# Patient Record
Sex: MALE | Race: WHITE | NOT HISPANIC OR LATINO
[De-identification: names, ages, dates, MRNs, and addresses within clinical notes are randomized per-mention and may not be internally consistent; named-entity substitution may affect disease eponyms.]

---

## 2019-12-10 PROBLEM — Z00.00 ENCOUNTER FOR PREVENTIVE HEALTH EXAMINATION: Status: ACTIVE | Noted: 2019-12-10

## 2019-12-15 ENCOUNTER — FORM ENCOUNTER (OUTPATIENT)
Age: 64
End: 2019-12-15

## 2019-12-16 ENCOUNTER — APPOINTMENT (OUTPATIENT)
Dept: RADIOLOGY | Facility: CLINIC | Age: 64
End: 2019-12-16

## 2019-12-16 ENCOUNTER — APPOINTMENT (OUTPATIENT)
Dept: ORTHOPEDIC SURGERY | Facility: CLINIC | Age: 64
End: 2019-12-16
Payer: COMMERCIAL

## 2019-12-16 ENCOUNTER — OUTPATIENT (OUTPATIENT)
Dept: OUTPATIENT SERVICES | Facility: HOSPITAL | Age: 64
LOS: 1 days | End: 2019-12-16
Payer: COMMERCIAL

## 2019-12-16 VITALS — BODY MASS INDEX: 25.71 KG/M2 | WEIGHT: 160 LBS | HEIGHT: 66 IN | RESPIRATION RATE: 16 BRPM

## 2019-12-16 PROCEDURE — 99204 OFFICE O/P NEW MOD 45 MIN: CPT

## 2019-12-16 PROCEDURE — 72170 X-RAY EXAM OF PELVIS: CPT | Mod: 26

## 2019-12-16 RX ORDER — LISINOPRIL 10 MG/1
10 TABLET ORAL
Refills: 0 | Status: ACTIVE | COMMUNITY

## 2020-01-21 ENCOUNTER — APPOINTMENT (OUTPATIENT)
Dept: PHYSICAL MEDICINE AND REHAB | Facility: CLINIC | Age: 65
End: 2020-01-21
Payer: COMMERCIAL

## 2020-01-21 VITALS
BODY MASS INDEX: 25.71 KG/M2 | OXYGEN SATURATION: 95 % | WEIGHT: 160 LBS | RESPIRATION RATE: 16 BRPM | HEIGHT: 66 IN | HEART RATE: 72 BPM

## 2020-01-21 DIAGNOSIS — F19.90 OTHER PSYCHOACTIVE SUBSTANCE USE, UNSPECIFIED, UNCOMPLICATED: ICD-10-CM

## 2020-01-21 PROCEDURE — 99203 OFFICE O/P NEW LOW 30 MIN: CPT

## 2020-01-27 ENCOUNTER — APPOINTMENT (OUTPATIENT)
Dept: ORTHOPEDIC SURGERY | Facility: CLINIC | Age: 65
End: 2020-01-27
Payer: COMMERCIAL

## 2020-01-27 DIAGNOSIS — M54.5 LOW BACK PAIN: ICD-10-CM

## 2020-01-27 PROCEDURE — 99213 OFFICE O/P EST LOW 20 MIN: CPT

## 2020-01-27 RX ORDER — MELOXICAM 15 MG/1
15 TABLET ORAL
Qty: 30 | Refills: 4 | Status: ACTIVE | COMMUNITY
Start: 2020-01-27 | End: 1900-01-01

## 2020-01-28 PROBLEM — F19.90 OCCASIONAL RECREATIONAL DRUG USE: Status: ACTIVE | Noted: 2020-01-21

## 2020-02-06 ENCOUNTER — APPOINTMENT (OUTPATIENT)
Dept: PHYSICAL MEDICINE AND REHAB | Facility: CLINIC | Age: 65
End: 2020-02-06

## 2020-03-09 ENCOUNTER — APPOINTMENT (OUTPATIENT)
Dept: ORTHOPEDIC SURGERY | Facility: CLINIC | Age: 65
End: 2020-03-09
Payer: COMMERCIAL

## 2020-03-09 DIAGNOSIS — M25.852 OTHER SPECIFIED JOINT DISORDERS, LEFT HIP: ICD-10-CM

## 2020-03-09 DIAGNOSIS — M25.851 OTHER SPECIFIED JOINT DISORDERS, RIGHT HIP: ICD-10-CM

## 2020-03-09 PROCEDURE — 20611 DRAIN/INJ JOINT/BURSA W/US: CPT | Mod: RT

## 2020-03-09 PROCEDURE — 99213 OFFICE O/P EST LOW 20 MIN: CPT | Mod: 25

## 2020-03-09 RX ORDER — DICLOFENAC SODIUM 10 MG/G
1 GEL TOPICAL DAILY
Qty: 1 | Refills: 3 | Status: ACTIVE | COMMUNITY
Start: 2020-03-09 | End: 1900-01-01

## 2020-03-10 ENCOUNTER — APPOINTMENT (OUTPATIENT)
Dept: ORTHOPEDIC SURGERY | Facility: CLINIC | Age: 65
End: 2020-03-10
Payer: COMMERCIAL

## 2020-03-10 PROCEDURE — 99213 OFFICE O/P EST LOW 20 MIN: CPT

## 2020-03-11 NOTE — ASSESSMENT
[FreeTextEntry1] : Assessment/Plan\par \par Bilateral hip OA (R>L)\par \par Interested in doing right THR first followed by left THR in 1 month\par \par Patient will benefit from the proposed procedure, she has failed a conservative treatment plan of supervised physical therapy, anti-inflammatory medications, and activity modification. She continues to have pain impacting her ability to perform ADL's and has a decreasing QoL. We will schedule this for the earliest mutually convenient time after the appropriate pre-op laboratory tests and clearances are obtained.  All questions were answered and a thorough explanation of RBA were given\par \par F/U  when schedules for right THR\par \par All medical record entries made by the PA/Scribe/Fellow are at my, Dr. Scott Joshua's direction and personally dictated by me on 03/10/2020]. I have reviewed the chart and agree that the record accurately reflects my personal performance of the history, physical exam, assessment, and plan. I have also personally directed reviewed, and agreed with the chart.\par

## 2020-03-11 NOTE — HISTORY OF PRESENT ILLNESS
[de-identified] : Otto is a 65 year old male here for bilateral hip pain (R>L). Patient has a history of hypertension and 40 plus years of smoking. He was refered by Dr. Barry for bilateral hip OA. She complains that she has difficulty ambulating for more than 3 blocks because of the pain in both hips. Patient is unable to ride a bike and go up the stairs. She does not find physical therapy helpful. Patient is interested in a THR as soon as possible to continue an active lifestyle. Patient denies any trauma, weakness, back pain, and radicular symptoms.

## 2020-06-12 ENCOUNTER — RESULT REVIEW (OUTPATIENT)
Age: 65
End: 2020-06-12

## 2020-06-12 ENCOUNTER — APPOINTMENT (OUTPATIENT)
Dept: ORTHOPEDIC SURGERY | Facility: CLINIC | Age: 65
End: 2020-06-12
Payer: COMMERCIAL

## 2020-06-12 ENCOUNTER — OUTPATIENT (OUTPATIENT)
Dept: OUTPATIENT SERVICES | Facility: HOSPITAL | Age: 65
LOS: 1 days | End: 2020-06-12
Payer: COMMERCIAL

## 2020-06-12 VITALS
HEIGHT: 66 IN | WEIGHT: 160 LBS | SYSTOLIC BLOOD PRESSURE: 130 MMHG | DIASTOLIC BLOOD PRESSURE: 64 MMHG | BODY MASS INDEX: 25.71 KG/M2 | OXYGEN SATURATION: 98 % | HEART RATE: 65 BPM | TEMPERATURE: 98.7 F

## 2020-06-12 DIAGNOSIS — D72.829 ELEVATED WHITE BLOOD CELL COUNT, UNSPECIFIED: ICD-10-CM

## 2020-06-12 DIAGNOSIS — Z01.818 ENCOUNTER FOR OTHER PREPROCEDURAL EXAMINATION: ICD-10-CM

## 2020-06-12 DIAGNOSIS — Z87.09 PERSONAL HISTORY OF OTHER DISEASES OF THE RESPIRATORY SYSTEM: ICD-10-CM

## 2020-06-12 DIAGNOSIS — Z86.79 PERSONAL HISTORY OF OTHER DISEASES OF THE CIRCULATORY SYSTEM: ICD-10-CM

## 2020-06-12 DIAGNOSIS — F17.200 NICOTINE DEPENDENCE, UNSPECIFIED, UNCOMPLICATED: ICD-10-CM

## 2020-06-12 DIAGNOSIS — M16.0 BILATERAL PRIMARY OSTEOARTHRITIS OF HIP: ICD-10-CM

## 2020-06-12 LAB
ANION GAP SERPL CALC-SCNC: 11 MMOL/L — SIGNIFICANT CHANGE UP (ref 5–17)
APPEARANCE UR: CLEAR — SIGNIFICANT CHANGE UP
APTT BLD: 34.5 SEC — SIGNIFICANT CHANGE UP (ref 27.5–36.3)
BILIRUB UR-MCNC: NEGATIVE — SIGNIFICANT CHANGE UP
BUN SERPL-MCNC: 17 MG/DL — SIGNIFICANT CHANGE UP (ref 7–23)
CALCIUM SERPL-MCNC: 8.9 MG/DL — SIGNIFICANT CHANGE UP (ref 8.4–10.5)
CHLORIDE SERPL-SCNC: 109 MMOL/L — HIGH (ref 96–108)
CO2 SERPL-SCNC: 24 MMOL/L — SIGNIFICANT CHANGE UP (ref 22–31)
COLOR SPEC: YELLOW — SIGNIFICANT CHANGE UP
CREAT SERPL-MCNC: 0.96 MG/DL — SIGNIFICANT CHANGE UP (ref 0.5–1.3)
DIFF PNL FLD: NEGATIVE — SIGNIFICANT CHANGE UP
GLUCOSE SERPL-MCNC: 115 MG/DL — HIGH (ref 70–99)
GLUCOSE UR QL: NEGATIVE — SIGNIFICANT CHANGE UP
HCT VFR BLD CALC: 44.8 % — SIGNIFICANT CHANGE UP (ref 39–50)
HGB BLD-MCNC: 14.3 G/DL — SIGNIFICANT CHANGE UP (ref 13–17)
INR BLD: 0.97 — SIGNIFICANT CHANGE UP (ref 0.88–1.16)
KETONES UR-MCNC: NEGATIVE — SIGNIFICANT CHANGE UP
LEUKOCYTE ESTERASE UR-ACNC: NEGATIVE — SIGNIFICANT CHANGE UP
MCHC RBC-ENTMCNC: 30.6 PG — SIGNIFICANT CHANGE UP (ref 27–34)
MCHC RBC-ENTMCNC: 31.9 GM/DL — LOW (ref 32–36)
MCV RBC AUTO: 95.9 FL — SIGNIFICANT CHANGE UP (ref 80–100)
NITRITE UR-MCNC: NEGATIVE — SIGNIFICANT CHANGE UP
NRBC # BLD: 0 /100 WBCS — SIGNIFICANT CHANGE UP (ref 0–0)
PH UR: 5.5 — SIGNIFICANT CHANGE UP (ref 5–8)
PLATELET # BLD AUTO: 312 K/UL — SIGNIFICANT CHANGE UP (ref 150–400)
POTASSIUM SERPL-MCNC: 4.8 MMOL/L — SIGNIFICANT CHANGE UP (ref 3.5–5.3)
POTASSIUM SERPL-SCNC: 4.8 MMOL/L — SIGNIFICANT CHANGE UP (ref 3.5–5.3)
PROT UR-MCNC: NEGATIVE MG/DL — SIGNIFICANT CHANGE UP
PROTHROM AB SERPL-ACNC: 11.1 SEC — SIGNIFICANT CHANGE UP (ref 10–12.9)
RBC # BLD: 4.67 M/UL — SIGNIFICANT CHANGE UP (ref 4.2–5.8)
RBC # FLD: 13.3 % — SIGNIFICANT CHANGE UP (ref 10.3–14.5)
SODIUM SERPL-SCNC: 144 MMOL/L — SIGNIFICANT CHANGE UP (ref 135–145)
SP GR SPEC: >=1.03 — SIGNIFICANT CHANGE UP (ref 1–1.03)
UROBILINOGEN FLD QL: 0.2 E.U./DL — SIGNIFICANT CHANGE UP
WBC # BLD: 13.94 K/UL — HIGH (ref 3.8–10.5)
WBC # FLD AUTO: 13.94 K/UL — HIGH (ref 3.8–10.5)

## 2020-06-12 PROCEDURE — 93010 ELECTROCARDIOGRAM REPORT: CPT

## 2020-06-12 PROCEDURE — 85730 THROMBOPLASTIN TIME PARTIAL: CPT

## 2020-06-12 PROCEDURE — 99214 OFFICE O/P EST MOD 30 MIN: CPT

## 2020-06-12 PROCEDURE — 85027 COMPLETE CBC AUTOMATED: CPT

## 2020-06-12 PROCEDURE — 71046 X-RAY EXAM CHEST 2 VIEWS: CPT | Mod: 26

## 2020-06-12 PROCEDURE — 87086 URINE CULTURE/COLONY COUNT: CPT

## 2020-06-12 PROCEDURE — 80048 BASIC METABOLIC PNL TOTAL CA: CPT

## 2020-06-12 PROCEDURE — 71046 X-RAY EXAM CHEST 2 VIEWS: CPT

## 2020-06-12 PROCEDURE — 85610 PROTHROMBIN TIME: CPT

## 2020-06-12 PROCEDURE — 93005 ELECTROCARDIOGRAM TRACING: CPT

## 2020-06-12 PROCEDURE — 81003 URINALYSIS AUTO W/O SCOPE: CPT

## 2020-06-12 RX ORDER — AZITHROMYCIN 250 MG/1
250 TABLET, FILM COATED ORAL
Qty: 1 | Refills: 0 | Status: DISCONTINUED | COMMUNITY
Start: 2020-06-12 | End: 2020-06-12

## 2020-06-13 LAB
CULTURE RESULTS: SIGNIFICANT CHANGE UP
SPECIMEN SOURCE: SIGNIFICANT CHANGE UP

## 2020-06-14 ENCOUNTER — LABORATORY RESULT (OUTPATIENT)
Age: 65
End: 2020-06-14

## 2020-06-15 ENCOUNTER — TRANSCRIPTION ENCOUNTER (OUTPATIENT)
Age: 65
End: 2020-06-15

## 2020-06-15 VITALS
SYSTOLIC BLOOD PRESSURE: 137 MMHG | HEIGHT: 66 IN | WEIGHT: 160.06 LBS | HEART RATE: 72 BPM | OXYGEN SATURATION: 99 % | DIASTOLIC BLOOD PRESSURE: 65 MMHG | RESPIRATION RATE: 16 BRPM | TEMPERATURE: 98 F

## 2020-06-15 NOTE — ASSESSMENT
[Other: _____] : [unfilled] [Patient Optimized for Surgery] : Patient optimized for surgery [As per surgery] : as per surgery [FreeTextEntry4] : The patient is a 65 year old man presenting with right hip osteoarthritis . The patient is planned for right total hip arthroplasty with Dr. Scott Joshua on 6/16/2020.\par \par His bloodwork shows leukocytosis with WBC 13.94.  The patient has chronic leukocytosis, and has had hematologic workup in the past.  He does not present with acute infectious symptoms.\par \par His Chest Xray shows questionable left lower lob consolidation.  I spoke directly with his Pulmonologist, Dr. Dameon Natarajan.  He confirms mild COPD which is stable.  He had CT Chest and PFTs last year, stable.  I discussed follow-up with his Pulmonologist postoperatively.\par \par -Labs/Diagnostics reviewed with patient in detail\par -METS >4\par -RCRI = 0, 3.9% 30-day risk of death/MI/cardiac arrest\par \par Patient is at low risk for low-intermediate risk surgery/procedure\par \par Patient is MEDICALLY OPTIMIZED TO PROCEED WITH PROPOSED SURGERY.\par \par EKG pending review\par \par **Case discussed with patient's primary care physician and specialist(s).\par Pulmonology - Dr. Dameon Natarajan\par \par Recommendations:\par 1) Chronic Leukocytosis - No signs of acute infection.  Continue to monitor\par 2) Questionable LLL Lung consolidation - Unclear if old.  Spoke with Pulmonologist.  OK to proceed with surgery.  Follow-up Post-Op\par 3) Continue Incruse inhaler.  Recommend incentive spirometry perioperatively\par \par

## 2020-06-15 NOTE — HISTORY OF PRESENT ILLNESS
[No Pertinent Cardiac History] : no history of aortic stenosis, atrial fibrillation, coronary artery disease, recent myocardial infarction, or implantable device/pacemaker [COPD] : COPD [No Adverse Anesthesia Reaction] : no adverse anesthesia reaction in self or family member [(Patient denies any chest pain, claudication, dyspnea on exertion, orthopnea, palpitations or syncope)] : Patient denies any chest pain, claudication, dyspnea on exertion, orthopnea, palpitations or syncope [Moderate (4-6 METs)] : Moderate (4-6 METs) [Chronic Anticoagulation] : no chronic anticoagulation [Chronic Kidney Disease] : no chronic kidney disease [Diabetes] : no diabetes [FreeTextEntry1] : Right Total Hip Arthroplasty [FreeTextEntry2] : 6/16//2020 [FreeTextEntry3] : Dr. Scott Joshua [FreeTextEntry4] : The patient is a 65 year old man presenting with right hip osteoarthritis . The patient is planned for right total hip arthroplasty with Dr. Scott Joshua on 6/16/2020.\par \par He has a history of COPD on Incruse Ellipta and follows with a Pulmonologist.  He has not been on steroids.  I spoke with his pulmonologist, Dr. Natarajan.  He states that he has mild COPD, and does not see contraindication to proceeding with surgery.\par \par The patient also reveals that he has chronic leukocytosis.  He has been worked up with a hematologist, and had a bone marrow biopsy which was apparently inconclusive.  The patient denies constitutional symptoms including fever, chills, malaise, weight loss, night sweats.  He denies productive cough, sinusitis, abdominal pain, nausea, vomiting, diarhea, urinary complaints.\par \par METS >4\par \par The patient denies recent/active cardiopulmonary symptoms including chest pain, shortness of breath, dyspnea of exertion, palpitations, swelling, headache, dizziness, syncope.\par \par The patient denies recent COVID exposure or COVID-related symptoms.\par \par  [FreeTextEntry5] : Chronic Leukocytosis

## 2020-06-15 NOTE — PHYSICAL EXAM
[No Acute Distress] : no acute distress [Well Nourished] : well nourished [Well Developed] : well developed [Well-Appearing] : well-appearing [EOMI] : extraocular movements intact [Normal Sclera/Conjunctiva] : normal sclera/conjunctiva [PERRL] : pupils equal round and reactive to light [Normal Oropharynx] : the oropharynx was normal [Normal Outer Ear/Nose] : the outer ears and nose were normal in appearance [Supple] : supple [No Lymphadenopathy] : no lymphadenopathy [No JVD] : no jugular venous distention [Thyroid Normal, No Nodules] : the thyroid was normal and there were no nodules present [No Respiratory Distress] : no respiratory distress  [Clear to Auscultation] : lungs were clear to auscultation bilaterally [Normal Rate] : normal rate  [No Accessory Muscle Use] : no accessory muscle use [Normal S1, S2] : normal S1 and S2 [Regular Rhythm] : with a regular rhythm [No Carotid Bruits] : no carotid bruits [No Murmur] : no murmur heard [No Abdominal Bruit] : a ~M bruit was not heard ~T in the abdomen [No Varicosities] : no varicosities [Pedal Pulses Present] : the pedal pulses are present [No Edema] : there was no peripheral edema [No Palpable Aorta] : no palpable aorta [No Extremity Clubbing/Cyanosis] : no extremity clubbing/cyanosis [Soft] : abdomen soft [Non-distended] : non-distended [Non Tender] : non-tender [No Masses] : no abdominal mass palpated [No HSM] : no HSM [Normal Bowel Sounds] : normal bowel sounds [Normal Posterior Cervical Nodes] : no posterior cervical lymphadenopathy [Normal Anterior Cervical Nodes] : no anterior cervical lymphadenopathy [No CVA Tenderness] : no CVA  tenderness [No Spinal Tenderness] : no spinal tenderness [No Rash] : no rash [Coordination Grossly Intact] : coordination grossly intact [No Focal Deficits] : no focal deficits [Normal Gait] : normal gait [Deep Tendon Reflexes (DTR)] : deep tendon reflexes were 2+ and symmetric [Normal Affect] : the affect was normal [Normal Insight/Judgement] : insight and judgment were intact [de-identified] : decreased hip ROM

## 2020-06-15 NOTE — RESULTS/DATA
[] : results reviewed [de-identified] : elevated WBC, no diff available [de-identified] : wnl [de-identified] : questionable LLL consolidation [de-identified] : pending [de-identified] : pending COVID screen, UA unremarkable

## 2020-06-15 NOTE — ADDENDUM
[FreeTextEntry1] : Labs/EKG/Imaging/COVID screen reviewed.  No acute abnormalities.  Patient may proceed with scheduled surgery.\par

## 2020-06-16 ENCOUNTER — APPOINTMENT (OUTPATIENT)
Dept: ORTHOPEDIC SURGERY | Facility: HOSPITAL | Age: 65
End: 2020-06-16
Payer: COMMERCIAL

## 2020-06-16 ENCOUNTER — INPATIENT (INPATIENT)
Facility: HOSPITAL | Age: 65
LOS: 0 days | Discharge: HOME HEALTH | DRG: 470 | End: 2020-06-17
Attending: ORTHOPAEDIC SURGERY | Admitting: ORTHOPAEDIC SURGERY
Payer: COMMERCIAL

## 2020-06-16 DIAGNOSIS — M16.11 UNILATERAL PRIMARY OSTEOARTHRITIS, RIGHT HIP: ICD-10-CM

## 2020-06-16 DIAGNOSIS — I10 ESSENTIAL (PRIMARY) HYPERTENSION: ICD-10-CM

## 2020-06-16 DIAGNOSIS — D72.829 ELEVATED WHITE BLOOD CELL COUNT, UNSPECIFIED: ICD-10-CM

## 2020-06-16 DIAGNOSIS — Z98.890 OTHER SPECIFIED POSTPROCEDURAL STATES: Chronic | ICD-10-CM

## 2020-06-16 LAB
MRSA PCR RESULT.: NEGATIVE — SIGNIFICANT CHANGE UP
S AUREUS DNA NOSE QL NAA+PROBE: NEGATIVE — SIGNIFICANT CHANGE UP

## 2020-06-16 PROCEDURE — 27130 TOTAL HIP ARTHROPLASTY: CPT | Mod: RT

## 2020-06-16 PROCEDURE — 72170 X-RAY EXAM OF PELVIS: CPT | Mod: 26

## 2020-06-16 RX ORDER — CEFAZOLIN SODIUM 1 G
2000 VIAL (EA) INJECTION EVERY 8 HOURS
Refills: 0 | Status: COMPLETED | OUTPATIENT
Start: 2020-06-16 | End: 2020-06-17

## 2020-06-16 RX ORDER — ONDANSETRON 8 MG/1
4 TABLET, FILM COATED ORAL EVERY 6 HOURS
Refills: 0 | Status: DISCONTINUED | OUTPATIENT
Start: 2020-06-16 | End: 2020-06-17

## 2020-06-16 RX ORDER — HYDROMORPHONE HYDROCHLORIDE 2 MG/ML
0.5 INJECTION INTRAMUSCULAR; INTRAVENOUS; SUBCUTANEOUS
Refills: 0 | Status: DISCONTINUED | OUTPATIENT
Start: 2020-06-16 | End: 2020-06-17

## 2020-06-16 RX ORDER — SODIUM CHLORIDE 9 MG/ML
1000 INJECTION, SOLUTION INTRAVENOUS
Refills: 0 | Status: DISCONTINUED | OUTPATIENT
Start: 2020-06-16 | End: 2020-06-17

## 2020-06-16 RX ORDER — AMLODIPINE BESYLATE 2.5 MG/1
10 TABLET ORAL DAILY
Refills: 0 | Status: DISCONTINUED | OUTPATIENT
Start: 2020-06-16 | End: 2020-06-17

## 2020-06-16 RX ORDER — POVIDONE-IODINE 5 %
1 AEROSOL (ML) TOPICAL ONCE
Refills: 0 | Status: COMPLETED | OUTPATIENT
Start: 2020-06-16 | End: 2020-06-16

## 2020-06-16 RX ORDER — CHLORHEXIDINE GLUCONATE 213 G/1000ML
1 SOLUTION TOPICAL ONCE
Refills: 0 | Status: COMPLETED | OUTPATIENT
Start: 2020-06-16 | End: 2020-06-16

## 2020-06-16 RX ORDER — OXYCODONE HYDROCHLORIDE 5 MG/1
5 TABLET ORAL EVERY 4 HOURS
Refills: 0 | Status: DISCONTINUED | OUTPATIENT
Start: 2020-06-16 | End: 2020-06-17

## 2020-06-16 RX ORDER — HYDROMORPHONE HYDROCHLORIDE 2 MG/ML
0.5 INJECTION INTRAMUSCULAR; INTRAVENOUS; SUBCUTANEOUS EVERY 4 HOURS
Refills: 0 | Status: DISCONTINUED | OUTPATIENT
Start: 2020-06-16 | End: 2020-06-17

## 2020-06-16 RX ORDER — MAGNESIUM HYDROXIDE 400 MG/1
30 TABLET, CHEWABLE ORAL ONCE
Refills: 0 | Status: DISCONTINUED | OUTPATIENT
Start: 2020-06-16 | End: 2020-06-17

## 2020-06-16 RX ORDER — OXYCODONE HYDROCHLORIDE 5 MG/1
10 TABLET ORAL EVERY 4 HOURS
Refills: 0 | Status: DISCONTINUED | OUTPATIENT
Start: 2020-06-16 | End: 2020-06-17

## 2020-06-16 RX ORDER — ASPIRIN/CALCIUM CARB/MAGNESIUM 324 MG
81 TABLET ORAL
Refills: 0 | Status: DISCONTINUED | OUTPATIENT
Start: 2020-06-16 | End: 2020-06-17

## 2020-06-16 RX ORDER — POLYETHYLENE GLYCOL 3350 17 G/17G
17 POWDER, FOR SOLUTION ORAL DAILY
Refills: 0 | Status: DISCONTINUED | OUTPATIENT
Start: 2020-06-16 | End: 2020-06-17

## 2020-06-16 RX ORDER — LISINOPRIL 2.5 MG/1
10 TABLET ORAL DAILY
Refills: 0 | Status: DISCONTINUED | OUTPATIENT
Start: 2020-06-16 | End: 2020-06-17

## 2020-06-16 RX ORDER — ACETAMINOPHEN 500 MG
975 TABLET ORAL EVERY 8 HOURS
Refills: 0 | Status: DISCONTINUED | OUTPATIENT
Start: 2020-06-16 | End: 2020-06-17

## 2020-06-16 RX ADMIN — Medication 1 APPLICATION(S): at 09:03

## 2020-06-16 RX ADMIN — Medication 975 MILLIGRAM(S): at 19:50

## 2020-06-16 RX ADMIN — Medication 81 MILLIGRAM(S): at 18:50

## 2020-06-16 RX ADMIN — Medication 100 MILLIGRAM(S): at 21:37

## 2020-06-16 RX ADMIN — CHLORHEXIDINE GLUCONATE 1 APPLICATION(S): 213 SOLUTION TOPICAL at 09:03

## 2020-06-16 RX ADMIN — OXYCODONE HYDROCHLORIDE 5 MILLIGRAM(S): 5 TABLET ORAL at 20:34

## 2020-06-16 RX ADMIN — SODIUM CHLORIDE 120 MILLILITER(S): 9 INJECTION, SOLUTION INTRAVENOUS at 21:37

## 2020-06-16 NOTE — PROGRESS NOTE ADULT - SUBJECTIVE AND OBJECTIVE BOX
Ortho Post Op Check    Procedure: Right JO   Surgeon: Dr. Joshua     Patient seen and examined at bedside in PACU   Pt comfortable without complaints, pain controlled  Denies CP, SOB, N/V, numbness/tingling     Vital Signs Last 24 Hrs  T(C): 36.2 (06-16-20 @ 13:40), Max: 36.2 (06-16-20 @ 13:40)  T(F): 97.2 (06-16-20 @ 13:40), Max: 97.2 (06-16-20 @ 13:40)  HR: 64 (06-16-20 @ 14:10) (60 - 68)  BP: 132/76 (06-16-20 @ 14:10) (132/76 - 152/78)  BP(mean): --  RR: 14 (06-16-20 @ 14:10) (14 - 16)  SpO2: 97% (06-16-20 @ 14:10) (96% - 99%)  AVSS    General: Pt Alert and oriented, NAD  Dressing C/D/I: gauze, tedagerm at the right hip   Pulses: 2+ DP pulses bilateral LE   Sensation: intact to light touch bilateral LE   Motor: EHL/FHL/TA/GS 5/5             Post-op X-Ray:    A/P: 65yMale POD#0 s/p   - Stable  - Pain Control  - DVT ppx:  - Post op abx:  - PT, WBS:     Ortho Pager [phone] Ortho Post Op Check    Procedure: Right JO   Surgeon: Dr. Joshua     Patient seen and examined at bedside in PACU   Pt comfortable without complaints, pain controlled  Denies CP, SOB, N/V, numbness/tingling     Vital Signs Last 24 Hrs  T(C): 36.2 (06-16-20 @ 13:40), Max: 36.2 (06-16-20 @ 13:40)  T(F): 97.2 (06-16-20 @ 13:40), Max: 97.2 (06-16-20 @ 13:40)  HR: 64 (06-16-20 @ 14:10) (60 - 68)  BP: 132/76 (06-16-20 @ 14:10) (132/76 - 152/78)  BP(mean): --  RR: 14 (06-16-20 @ 14:10) (14 - 16)  SpO2: 97% (06-16-20 @ 14:10) (96% - 99%)  AVSS    General: Pt Alert and oriented, NAD  Dressing C/D/I: gauze, tedagerm at the right hip   Pulses: 2+ DP pulses bilateral LE   Sensation: intact to light touch bilateral LE   Motor: EHL/FHL/TA/GS 5/5             Post-op X-Ray: Right total hip replacement in anatomic alignment    A/P: 65yMale POD#0 s/p right JO      - Stable  - Pain Control: IV and PO PRN  - DVT ppx: 81mg BID  - Post op abx: ancef x2  - PT, WBS: protected WB RLE    Ortho Pager [phone]

## 2020-06-16 NOTE — H&P ADULT - NSHPLABSRESULTS_GEN_ALL_CORE
Preop CBC, BMP, PT/INR, PTT, UA WNL  Urine culture with <10,000 of mixed growth  3M DOS  Preop CXR WNL per clearance   Preop EKG WNL per clearance Preop BMP, PT/INR, PTT, UA WNL  Preop CBC 13.94 WBC - known chronic idiopathic leukocytosis  Urine culture with <10,000 of mixed growth  3M DOS  Preop CXR WNL per clearance   Preop EKG WNL per clearance

## 2020-06-16 NOTE — PHYSICAL THERAPY INITIAL EVALUATION ADULT - GAIT DEVIATIONS NOTED, PT EVAL
decreased chaitanya/toe clearance decreased/decreased weight-shifting ability/decreased stride length

## 2020-06-16 NOTE — H&P ADULT - PROBLEM SELECTOR PLAN 1
Admit to Orthopedic service  For elective right total hip replacement  Medically cleared for surgery by Dr. Francis

## 2020-06-16 NOTE — H&P ADULT - HISTORY OF PRESENT ILLNESS
66 yo M with right hip pain x years worsened over the last 6 months without improvement. Failed conservative treatments. Denies numbness, tingling. Ambulates without assist. Pt denies any recent fevers/chills, chest pain, body aches, shortness of breath, sick contacts. Denies history of DVT/PE. Presents today for elective right total hip replacement.

## 2020-06-16 NOTE — PHYSICAL THERAPY INITIAL EVALUATION ADULT - ADDITIONAL COMMENTS
Pt reports being independent without use of AD in PLOF. He reports not having stairs to access home and living space, however, stairs required to access basement (1 flight). Pt lives with his son and friend.

## 2020-06-16 NOTE — H&P ADULT - NSHPPHYSICALEXAM_GEN_ALL_CORE
NAD, sitting comfortably in chair  Decreased ROM of right hip secondary to pain, EHL/FHL/GS/TA 5/5 BLE, 2+ DP BLE, SILT throughout

## 2020-06-16 NOTE — PHYSICAL THERAPY INITIAL EVALUATION ADULT - PERTINENT HX OF CURRENT PROBLEM, REHAB EVAL
64 yo M with right hip pain x years worsened over the last 6 months without improvement. Failed conservative treatments. Denies numbness, tingling. Ambulates without assist. Pt denies any recent fevers/chills, chest pain, body aches, shortness of breath, sick contacts. Denies history of DVT/PE. Presents today for elective right total hip replacement.

## 2020-06-17 ENCOUNTER — TRANSCRIPTION ENCOUNTER (OUTPATIENT)
Age: 65
End: 2020-06-17

## 2020-06-17 VITALS — DIASTOLIC BLOOD PRESSURE: 70 MMHG | SYSTOLIC BLOOD PRESSURE: 110 MMHG

## 2020-06-17 LAB
ANION GAP SERPL CALC-SCNC: 11 MMOL/L — SIGNIFICANT CHANGE UP (ref 5–17)
BUN SERPL-MCNC: 13 MG/DL — SIGNIFICANT CHANGE UP (ref 7–23)
CALCIUM SERPL-MCNC: 8.3 MG/DL — LOW (ref 8.4–10.5)
CHLORIDE SERPL-SCNC: 104 MMOL/L — SIGNIFICANT CHANGE UP (ref 96–108)
CO2 SERPL-SCNC: 23 MMOL/L — SIGNIFICANT CHANGE UP (ref 22–31)
CREAT SERPL-MCNC: 0.72 MG/DL — SIGNIFICANT CHANGE UP (ref 0.5–1.3)
GLUCOSE SERPL-MCNC: 129 MG/DL — HIGH (ref 70–99)
HCT VFR BLD CALC: 37.5 % — LOW (ref 39–50)
HCV AB S/CO SERPL IA: 12.35 S/CO — SIGNIFICANT CHANGE UP
HCV AB SERPL-IMP: REACTIVE
HGB BLD-MCNC: 12.1 G/DL — LOW (ref 13–17)
MCHC RBC-ENTMCNC: 30.4 PG — SIGNIFICANT CHANGE UP (ref 27–34)
MCHC RBC-ENTMCNC: 32.3 GM/DL — SIGNIFICANT CHANGE UP (ref 32–36)
MCV RBC AUTO: 94.2 FL — SIGNIFICANT CHANGE UP (ref 80–100)
NRBC # BLD: 0 /100 WBCS — SIGNIFICANT CHANGE UP (ref 0–0)
PLATELET # BLD AUTO: 247 K/UL — SIGNIFICANT CHANGE UP (ref 150–400)
POTASSIUM SERPL-MCNC: 4.3 MMOL/L — SIGNIFICANT CHANGE UP (ref 3.5–5.3)
POTASSIUM SERPL-SCNC: 4.3 MMOL/L — SIGNIFICANT CHANGE UP (ref 3.5–5.3)
RBC # BLD: 3.98 M/UL — LOW (ref 4.2–5.8)
RBC # FLD: 13 % — SIGNIFICANT CHANGE UP (ref 10.3–14.5)
SODIUM SERPL-SCNC: 138 MMOL/L — SIGNIFICANT CHANGE UP (ref 135–145)
WBC # BLD: 22.55 K/UL — HIGH (ref 3.8–10.5)
WBC # FLD AUTO: 22.55 K/UL — HIGH (ref 3.8–10.5)

## 2020-06-17 PROCEDURE — 72170 X-RAY EXAM OF PELVIS: CPT

## 2020-06-17 PROCEDURE — 87641 MR-STAPH DNA AMP PROBE: CPT

## 2020-06-17 PROCEDURE — 86803 HEPATITIS C AB TEST: CPT

## 2020-06-17 PROCEDURE — 87521 HEPATITIS C PROBE&RVRS TRNSC: CPT

## 2020-06-17 PROCEDURE — 36415 COLL VENOUS BLD VENIPUNCTURE: CPT

## 2020-06-17 PROCEDURE — 97161 PT EVAL LOW COMPLEX 20 MIN: CPT

## 2020-06-17 PROCEDURE — C1776: CPT

## 2020-06-17 PROCEDURE — 80048 BASIC METABOLIC PNL TOTAL CA: CPT

## 2020-06-17 PROCEDURE — 99223 1ST HOSP IP/OBS HIGH 75: CPT

## 2020-06-17 PROCEDURE — 97116 GAIT TRAINING THERAPY: CPT

## 2020-06-17 PROCEDURE — 85027 COMPLETE CBC AUTOMATED: CPT

## 2020-06-17 RX ORDER — ASPIRIN/CALCIUM CARB/MAGNESIUM 324 MG
1 TABLET ORAL
Qty: 60 | Refills: 0
Start: 2020-06-17 | End: 2020-07-16

## 2020-06-17 RX ORDER — ACETAMINOPHEN 500 MG
650 TABLET ORAL EVERY 6 HOURS
Refills: 0 | Status: DISCONTINUED | OUTPATIENT
Start: 2020-06-17 | End: 2020-06-17

## 2020-06-17 RX ORDER — ASPIRIN/CALCIUM CARB/MAGNESIUM 324 MG
1 TABLET ORAL
Qty: 0 | Refills: 0 | DISCHARGE
Start: 2020-06-17 | End: 2020-07-16

## 2020-06-17 RX ORDER — KETOROLAC TROMETHAMINE 30 MG/ML
15 SYRINGE (ML) INJECTION EVERY 6 HOURS
Refills: 0 | Status: DISCONTINUED | OUTPATIENT
Start: 2020-06-17 | End: 2020-06-17

## 2020-06-17 RX ORDER — ACETAMINOPHEN 500 MG
2 TABLET ORAL
Qty: 0 | Refills: 0 | DISCHARGE
Start: 2020-06-17

## 2020-06-17 RX ORDER — OXYCODONE HYDROCHLORIDE 5 MG/1
1 TABLET ORAL
Qty: 42 | Refills: 0
Start: 2020-06-17 | End: 2020-06-23

## 2020-06-17 RX ORDER — DICLOFENAC SODIUM 30 MG/G
1 GEL TOPICAL
Qty: 0 | Refills: 0 | DISCHARGE

## 2020-06-17 RX ADMIN — OXYCODONE HYDROCHLORIDE 10 MILLIGRAM(S): 5 TABLET ORAL at 10:17

## 2020-06-17 RX ADMIN — Medication 100 MILLIGRAM(S): at 05:17

## 2020-06-17 RX ADMIN — OXYCODONE HYDROCHLORIDE 10 MILLIGRAM(S): 5 TABLET ORAL at 05:31

## 2020-06-17 RX ADMIN — AMLODIPINE BESYLATE 10 MILLIGRAM(S): 2.5 TABLET ORAL at 05:31

## 2020-06-17 RX ADMIN — POLYETHYLENE GLYCOL 3350 17 GRAM(S): 17 POWDER, FOR SOLUTION ORAL at 11:58

## 2020-06-17 RX ADMIN — Medication 81 MILLIGRAM(S): at 05:31

## 2020-06-17 RX ADMIN — Medication 650 MILLIGRAM(S): at 11:58

## 2020-06-17 RX ADMIN — LISINOPRIL 10 MILLIGRAM(S): 2.5 TABLET ORAL at 06:58

## 2020-06-17 RX ADMIN — OXYCODONE HYDROCHLORIDE 10 MILLIGRAM(S): 5 TABLET ORAL at 00:56

## 2020-06-17 NOTE — CONSULT NOTE ADULT - ASSESSMENT
65M w tobacco use (2-3PPD x40y), HTN, HCV s/p treatment, COPD on incruse, R hip OA here for R JO w Dr. Joshua    #Post-op state - pain controlled. on bowel regimen, IS at bedside. PPx: ASA BID. Dispo: HPT  #R Hip OA - s/p R JO, mgmt per primary team  #COPD - stable. does not appear in acute exacerbation. c/w incruse daily  #HTN - at target. c/w home amlodipine and lisinopril  #HCV - s/p treatment year ago per pt  #Tobacco use - +cravings but declining nicotine patch. Pre-contemplative.  #Leukocytosis - likely reactive from post-operative stress. Non-toxic appearing. Baseline at 14 -- hx states idiopathic elevated baseline    Recommendations  --Counseled patient on smoking cessation. Readiness to quit contemplative. recommend continued reinforcement as outpatient  --From medical standpoint, patient optimized for disposition home

## 2020-06-17 NOTE — DISCHARGE NOTE PROVIDER - NSDCCPCAREPLAN_GEN_ALL_CORE_FT
PRINCIPAL DISCHARGE DIAGNOSIS  Diagnosis: Osteoarthritis of right hip  Assessment and Plan of Treatment: improvement s/p right total hip replacement

## 2020-06-17 NOTE — DISCHARGE NOTE PROVIDER - NSDCACTIVITY_GEN_ALL_CORE
Do not drive or operate machinery/Walking - Indoors allowed/Showering allowed/No heavy lifting/straining

## 2020-06-17 NOTE — DISCHARGE NOTE PROVIDER - NSDCFUADDINST_GEN_ALL_CORE_FT
50% protected weight on operated hip using a rolling walker  Anterior hip precautions to prevent hip dislocation  No crossing your legs. Do not bend past your waist.  Use long-handled reach to pick things up if purchased.  Sit in a high chair.  If you do not have a high chair, use cushions on the chair  No strenuous activity, heavy lifting, driving or returning to work until cleared by MD.  You may shower - dressing is water-resistant, no soaking in bathtubs.  Keep dressing on your hip until the follow up appointment.  Try to have regular bowel movements, take stool softener or laxative if necessary.  May take Pepcid or Zantac for upset stomach.  Swelling may travel all the way down leg to foot, this is normal and will subside in a few weeks.  Call to schedule an appt with Dr. Joshua for follow up.    Contact your doctor if you experience: fever greater than 101.5, chills, chest pain, difficulty breathing, redness or excessive drainage around the incision, other concerns.  Follow up with your primary care provider.

## 2020-06-17 NOTE — OCCUPATIONAL THERAPY INITIAL EVALUATION ADULT - GENERAL OBSERVATIONS, REHAB EVAL
Patient cleared for Occupational Therapy by IVETTE Ken and Ortho NP Anand for PWB 50% RLE. Pt received standing, NAD, +IV heplock.

## 2020-06-17 NOTE — DISCHARGE NOTE PROVIDER - CARE PROVIDER_API CALL
Scott Joshua)  Orthopaedic Surgery  [address]  [place], [place] [address]  Phone: [phone]  Fax: [phone]  Follow Up Time: 2 weeks

## 2020-06-17 NOTE — OCCUPATIONAL THERAPY INITIAL EVALUATION ADULT - PERTINENT HX OF CURRENT PROBLEM, REHAB EVAL
64 yo M with right hip pain x years worsened over the last 6 months without improvement. Failed conservative treatments. Denies numbness, tingling. Ambulates without assist. Pt denies any recent fevers/chills, chest pain, body aches, shortness of breath, sick contacts. Denies history of DVT/PE. Presents today for elective right total hip replacement. S/P R JO 6/16/20.

## 2020-06-17 NOTE — DISCHARGE NOTE PROVIDER - NSDCHHNEEDSERVICE_GEN_ALL_CORE
Rehabilitation services/Teaching and training/Wound care and assessment/Observation and assessment/Medication teaching and assessment

## 2020-06-17 NOTE — DISCHARGE NOTE PROVIDER - NSDCMRMEDTOKEN_GEN_ALL_CORE_FT
acetaminophen 325 mg oral tablet: 2 tab(s) orally every 6 hours, as needed for mild pain  amLODIPine 10 mg oral tablet: 1 tab(s) orally once a day  aspirin 81 mg oral delayed release tablet: 1 tab(s) orally 2 times a day for 1 month  lisinopril 10 mg oral tablet: 1 tab(s) orally once a day  meloxicam 15 mg oral tablet: 1 tab(s) orally once a day  oxyCODONE 5 mg oral tablet: 1-2 tab(s) orally every 4-6 hours, as needed for moderate to severe pain MDD:6

## 2020-06-17 NOTE — PROGRESS NOTE ADULT - SUBJECTIVE AND OBJECTIVE BOX
Ortho Note    Pt seen and examined at bedside. Reports appropriate incisional pain but improves with pain meds  Denies CP, SOB, N/V, numbness/tingling     Vital Signs Last 24 Hrs  T(C): 36.7 (06-17-20 @ 05:29), Max: 36.7 (06-17-20 @ 05:29)  T(F): 98.1 (06-17-20 @ 05:29), Max: 98.1 (06-17-20 @ 05:29)  HR: 66 (06-17-20 @ 05:29) (66 - 66)  BP: 128/74 (06-17-20 @ 05:29) (128/74 - 128/74)  BP(mean): --  RR: 16 (06-17-20 @ 05:29) (16 - 16)  SpO2: 96% (06-17-20 @ 05:29) (96% - 96%)  AVSS    General: Pt Alert and oriented, NAD  Right hip guaze, ABD and tegaderm DSG C/D/I  Sensation intact s/s/sp/dp/t and symmetric   Motor: EHL/FHL/TA/GS 5/5  2+ DP/PT pulse  Toes WWP      A/P: 65yMale POD#1 s/p R JO    - Stable  - Pain Control  - DVT ppx: ASA 81  - PT, WBS: 50% WB RLE  - f/u AM labs  - continue home meds  - dispo: home pending PT clearance     Ortho Pager [phone]

## 2020-06-17 NOTE — CONSULT NOTE ADULT - SUBJECTIVE AND OBJECTIVE BOX
65M w tobacco use (2-3PPD x40y), HTN, HCV s/p treatment, COPD on incruse, R hip OA here for R JO w Dr. Joshua    Pt presenting with progressive R hip pain and was previously scheduled for R JO in late March. Delayed due to pandemic. Today states pain in R thigh is controlled. Worked w PT wo lightheadedness, chest pain, dyspnea. No numbness. Eating wo N/V/Abd pain. No dysuria. No BM or flatus.     FH: non-contributory  SH: smokes 2-3 ppd x40y. No EtOH    64 yo M with right hip pain x years worsened over the last 6 months without improvement. Failed conservative treatments. Denies numbness, tingling. Ambulates without assist. Pt denies any recent fevers/chills, chest pain, body aches, shortness of breath, sick contacts. Denies history of DVT/PE. Presents today for elective right total hip replacement. (16 Jun 2020 09:23)      PAST MEDICAL & SURGICAL HISTORY:  Leukocytosis: idiopathic  Hypertension  H/O hernia repair    Home Meds: Home Medications:  acetaminophen 325 mg oral tablet: 2 tab(s) orally every 6 hours, as needed for mild pain (17 Jun 2020 10:24)  amLODIPine 10 mg oral tablet: 1 tab(s) orally once a day (16 Jun 2020 08:38)  lisinopril 10 mg oral tablet: 1 tab(s) orally once a day (16 Jun 2020 08:38)  meloxicam 15 mg oral tablet: 1 tab(s) orally once a day (16 Jun 2020 08:38)    Allergies: Allergies    No Known Allergies    Intolerances      Soc:   Advanced Directives: Presumed Full Code     CURRENT MEDICATIONS:   --------------------------------------------------------------------------------------  Neurologic Medications  acetaminophen   Tablet .. 650 milliGRAM(s) Oral every 6 hours  HYDROmorphone  Injectable 0.5 milliGRAM(s) IV Push every 4 hours PRN breakthrough pain  HYDROmorphone  Injectable 0.5 milliGRAM(s) IV Push every 15 minutes  ketorolac   Injectable 15 milliGRAM(s) IV Push every 6 hours  ondansetron Injectable 4 milliGRAM(s) IV Push every 6 hours PRN Nausea and/or Vomiting  oxyCODONE    IR 5 milliGRAM(s) Oral every 4 hours PRN Moderate Pain (4 - 6)  oxyCODONE    IR 10 milliGRAM(s) Oral every 4 hours PRN Severe Pain (7 - 10)    Respiratory Medications    Cardiovascular Medications  amLODIPine   Tablet 10 milliGRAM(s) Oral daily  lisinopril 10 milliGRAM(s) Oral daily    Gastrointestinal Medications  aluminum hydroxide/magnesium hydroxide/simethicone Suspension 30 milliLiter(s) Oral four times a day PRN Indigestion  lactated ringers. 1000 milliLiter(s) IV Continuous <Continuous>  magnesium hydroxide Suspension 30 milliLiter(s) Oral once PRN Constipation  polyethylene glycol 3350 17 Gram(s) Oral daily    Genitourinary Medications    Hematologic/Oncologic Medications  aspirin enteric coated 81 milliGRAM(s) Oral two times a day    Antimicrobial/Immunologic Medications    Endocrine/Metabolic Medications    Topical/Other Medications    --------------------------------------------------------------------------------------    VITAL SIGNS, INS/OUTS (last 24 hours):  --------------------------------------------------------------------------------------  ICU Vital Signs Last 24 Hrs  T(C): 37 (17 Jun 2020 08:57), Max: 37 (17 Jun 2020 08:57)  T(F): 98.6 (17 Jun 2020 08:57), Max: 98.6 (17 Jun 2020 08:57)  HR: 70 (17 Jun 2020 08:57) (60 - 82)  BP: 110/70 (17 Jun 2020 10:40) (104/61 - 179/96)  BP(mean): --  ABP: --  ABP(mean): --  RR: 18 (17 Jun 2020 08:57) (14 - 19)  SpO2: 94% (17 Jun 2020 08:57) (94% - 99%)    I&O's Summary    16 Jun 2020 07:01  -  17 Jun 2020 07:00  --------------------------------------------------------  IN: 960 mL / OUT: 1950 mL / NET: -990 mL    17 Jun 2020 07:01  -  17 Jun 2020 13:39  --------------------------------------------------------  IN: 200 mL / OUT: 0 mL / NET: 200 mL      --------------------------------------------------------------------------------------    EXAM:  GEN: Male in NAD on RA  HEENT: NC/AT, MMM  CV: RRR, nml S1S2, no BLE edema  PULM: nml effort, faint expiratory wheeze in LLL but otherwise CTAB wo rales, rhonchi  ABD: Soft, NABS, non-tender  NEURO: Alert, moving all extremities, sensation intact  PSYCH: appropriate, conversant    LABS  --------------------------------------------------------------------------------------  Labs:  CAPILLARY BLOOD GLUCOSE                              12.1   22.55 )-----------( 247      ( 17 Jun 2020 06:33 )             37.5         06-17    138  |  104  |  13  ----------------------------<  129<H>  4.3   |  23  |  0.72      Calcium, Total Serum: 8.3 mg/dL (06-17-20 @ 06:33)      LFTs:         Coags:                  --------------------------------------------------------------------------------------    OTHER LABS    IMAGING RESULTS  ****************

## 2020-06-17 NOTE — DISCHARGE NOTE NURSING/CASE MANAGEMENT/SOCIAL WORK - PATIENT PORTAL LINK FT
You can access the FollowMyHealth Patient Portal offered by [facility] by registering at the following website: [email or web address]. By joining [facility]’s FollowMyHealth portal, you will also be able to view your health information using other applications (apps) compatible with our system.

## 2020-06-17 NOTE — DISCHARGE NOTE PROVIDER - NSDCCPTREATMENT_GEN_ALL_CORE_FT
PRINCIPAL PROCEDURE  Procedure: Right total hip arthroplasty  Findings and Treatment: osteoarthritis

## 2020-06-17 NOTE — OCCUPATIONAL THERAPY INITIAL EVALUATION ADULT - NS ASR DRESSING EQUIP NEEDS
hip kit/pt will  hip kit at [facility], as [facility] is out of stock. REESE and april ordered through John with [facility].

## 2020-06-17 NOTE — DISCHARGE NOTE PROVIDER - HOSPITAL COURSE
Admitted 6/16/20    Surgery- right total hip replacement    Juanis-op Antibiotics    Pain control    DVT prophylaxis    OOB/Physical Therapy/ Occupational Therapy

## 2020-06-19 DIAGNOSIS — I10 ESSENTIAL (PRIMARY) HYPERTENSION: ICD-10-CM

## 2020-06-19 DIAGNOSIS — D72.828 OTHER ELEVATED WHITE BLOOD CELL COUNT: ICD-10-CM

## 2020-06-19 DIAGNOSIS — M16.11 UNILATERAL PRIMARY OSTEOARTHRITIS, RIGHT HIP: ICD-10-CM

## 2020-06-19 DIAGNOSIS — F17.210 NICOTINE DEPENDENCE, CIGARETTES, UNCOMPLICATED: ICD-10-CM

## 2020-06-19 DIAGNOSIS — J44.9 CHRONIC OBSTRUCTIVE PULMONARY DISEASE, UNSPECIFIED: ICD-10-CM

## 2020-06-19 DIAGNOSIS — Z86.19 PERSONAL HISTORY OF OTHER INFECTIOUS AND PARASITIC DISEASES: ICD-10-CM

## 2020-06-19 PROBLEM — D72.829 ELEVATED WHITE BLOOD CELL COUNT, UNSPECIFIED: Chronic | Status: ACTIVE | Noted: 2020-06-16

## 2020-06-29 ENCOUNTER — APPOINTMENT (OUTPATIENT)
Dept: ORTHOPEDIC SURGERY | Facility: CLINIC | Age: 65
End: 2020-06-29
Payer: COMMERCIAL

## 2020-06-29 VITALS — TEMPERATURE: 98.6 F

## 2020-06-29 DIAGNOSIS — Z98.890 OTHER SPECIFIED POSTPROCEDURAL STATES: ICD-10-CM

## 2020-06-29 PROCEDURE — 99024 POSTOP FOLLOW-UP VISIT: CPT

## 2020-06-30 NOTE — PHYSICAL EXAM
[de-identified] : Focused exam right hip: Well healed surgical incision, closed with Prineo wound care device, no swelling erythema or drainage. ROM and strength not assessed. DNVI. Ambulates with single point cane in left hand.

## 2020-06-30 NOTE — HISTORY OF PRESENT ILLNESS
[de-identified] : Follow up after RTHA 2 weeks ago. Not taking pain meds, feels great. Very happy with results and wants to schedule LTHA as outpatient. No complaints at this time.

## 2020-06-30 NOTE — ASSESSMENT
[FreeTextEntry1] : A\par S/P RTHA doing great\par LH Arthritis\par P\par Continue ASA 81 BID\par Begin planning for LTHA in a few weeks\par \par \par HARPREET will benefit from the proposed procedure, he has failed a conservative treatment plan of supervised physical therapy, anti-inflammatory medications, and activity modification. He continues to have pain impacting his ability to perform ADL's and has a decreasing QoL.  We will schedule this for the earliest mutually convenient time after the appropriate pre-op laboratory tests and clearances are obtained.  All questions were answered and a thorough explanation of RBA were given.\par \par Given the COVID19 pandemic, we have discussed the RBA of proceeding with this case with the patient.\par \par Delay or further delay beyond 4 weeks risks significant patient harm, would prolong the current hospital stay, increase the likelihood of a future hospital admission or make later surgery more complex or risk.  This case is appropriately indicated due to intractable pain, increasing need for opioid medications, failure to respond to an appropriate course of non-operative treatment, patient may begin to experience functional loss of ADLs, neurological deficits, or other potential adverse outcomes by delaying this procedure.\par \par All medical record entries made by the PA/Marino/Fellow are at my, Dr. Scott Joshua's direction and personally dictated by me on 06/29/2020]. I have reviewed the chart and agree that the record accurately reflects my personal performance of the history, physical exam, assessment, and plan. I have also personally directed reviewed, and agreed with the chart.\par \par

## 2020-07-13 ENCOUNTER — TRANSCRIPTION ENCOUNTER (OUTPATIENT)
Age: 65
End: 2020-07-13

## 2020-07-13 ENCOUNTER — APPOINTMENT (OUTPATIENT)
Dept: ORTHOPEDIC SURGERY | Facility: CLINIC | Age: 65
End: 2020-07-13
Payer: COMMERCIAL

## 2020-07-13 ENCOUNTER — LABORATORY RESULT (OUTPATIENT)
Age: 65
End: 2020-07-13

## 2020-07-13 VITALS
WEIGHT: 142.42 LBS | SYSTOLIC BLOOD PRESSURE: 136 MMHG | RESPIRATION RATE: 16 BRPM | HEART RATE: 66 BPM | TEMPERATURE: 97 F | OXYGEN SATURATION: 99 % | DIASTOLIC BLOOD PRESSURE: 67 MMHG | HEIGHT: 66 IN

## 2020-07-13 VITALS
HEIGHT: 66 IN | BODY MASS INDEX: 25.71 KG/M2 | HEART RATE: 88 BPM | WEIGHT: 160 LBS | DIASTOLIC BLOOD PRESSURE: 70 MMHG | OXYGEN SATURATION: 98 % | TEMPERATURE: 98.2 F | SYSTOLIC BLOOD PRESSURE: 140 MMHG

## 2020-07-13 DIAGNOSIS — Z01.818 ENCOUNTER FOR OTHER PREPROCEDURAL EXAMINATION: ICD-10-CM

## 2020-07-13 DIAGNOSIS — Z86.2 PERSONAL HISTORY OF DISEASES OF THE BLOOD AND BLOOD-FORMING ORGANS AND CERTAIN DISORDERS INVOLVING THE IMMUNE MECHANISM: ICD-10-CM

## 2020-07-13 DIAGNOSIS — M16.12 UNILATERAL PRIMARY OSTEOARTHRITIS, LEFT HIP: ICD-10-CM

## 2020-07-13 PROCEDURE — 99214 OFFICE O/P EST MOD 30 MIN: CPT | Mod: 24

## 2020-07-13 RX ORDER — OXYCODONE 5 MG/1
5 TABLET ORAL
Qty: 42 | Refills: 0 | Status: ACTIVE | COMMUNITY
Start: 2020-06-17

## 2020-07-13 RX ORDER — UMECLIDINIUM 62.5 UG/1
62.5 AEROSOL, POWDER ORAL
Qty: 30 | Refills: 0 | Status: ACTIVE | COMMUNITY
Start: 2020-06-08

## 2020-07-13 RX ORDER — LISINOPRIL 5 MG/1
5 TABLET ORAL
Qty: 30 | Refills: 0 | Status: ACTIVE | COMMUNITY
Start: 2020-06-08

## 2020-07-14 ENCOUNTER — APPOINTMENT (OUTPATIENT)
Dept: ORTHOPEDIC SURGERY | Facility: HOSPITAL | Age: 65
End: 2020-07-14
Payer: COMMERCIAL

## 2020-07-14 ENCOUNTER — INPATIENT (INPATIENT)
Facility: HOSPITAL | Age: 65
LOS: 0 days | Discharge: HOME | DRG: 470 | End: 2020-07-15
Attending: ORTHOPAEDIC SURGERY | Admitting: ORTHOPAEDIC SURGERY
Payer: COMMERCIAL

## 2020-07-14 DIAGNOSIS — Z98.890 OTHER SPECIFIED POSTPROCEDURAL STATES: Chronic | ICD-10-CM

## 2020-07-14 DIAGNOSIS — I10 ESSENTIAL (PRIMARY) HYPERTENSION: ICD-10-CM

## 2020-07-14 DIAGNOSIS — M16.12 UNILATERAL PRIMARY OSTEOARTHRITIS, LEFT HIP: ICD-10-CM

## 2020-07-14 DIAGNOSIS — Z96.641 PRESENCE OF RIGHT ARTIFICIAL HIP JOINT: Chronic | ICD-10-CM

## 2020-07-14 LAB
BASOPHILS # BLD AUTO: 0.1 K/UL
BASOPHILS NFR BLD AUTO: 0.7 %
EOSINOPHIL # BLD AUTO: 0.21 K/UL
EOSINOPHIL NFR BLD AUTO: 1.5 %
HCT VFR BLD CALC: 40.1 % — SIGNIFICANT CHANGE UP (ref 39–50)
HCT VFR BLD CALC: 45.3 %
HGB BLD-MCNC: 12.6 G/DL — LOW (ref 13–17)
HGB BLD-MCNC: 13.5 G/DL
IMM GRANULOCYTES NFR BLD AUTO: 0.6 %
LYMPHOCYTES # BLD AUTO: 2.67 K/UL
LYMPHOCYTES NFR BLD AUTO: 19.5 %
MAN DIFF?: NORMAL
MCHC RBC-ENTMCNC: 29.8 GM/DL
MCHC RBC-ENTMCNC: 30.3 PG
MCHC RBC-ENTMCNC: 30.5 PG — SIGNIFICANT CHANGE UP (ref 27–34)
MCHC RBC-ENTMCNC: 31.4 GM/DL — LOW (ref 32–36)
MCV RBC AUTO: 101.8 FL
MCV RBC AUTO: 97.1 FL — SIGNIFICANT CHANGE UP (ref 80–100)
MONOCYTES # BLD AUTO: 0.92 K/UL
MONOCYTES NFR BLD AUTO: 6.7 %
NEUTROPHILS # BLD AUTO: 9.71 K/UL
NEUTROPHILS NFR BLD AUTO: 71 %
NRBC # BLD: 0 /100 WBCS — SIGNIFICANT CHANGE UP (ref 0–0)
PLATELET # BLD AUTO: 312 K/UL — SIGNIFICANT CHANGE UP (ref 150–400)
PLATELET # BLD AUTO: 409 K/UL
RBC # BLD: 4.13 M/UL — LOW (ref 4.2–5.8)
RBC # BLD: 4.45 M/UL
RBC # FLD: 14.5 % — SIGNIFICANT CHANGE UP (ref 10.3–14.5)
RBC # FLD: 14.7 %
WBC # BLD: 16.61 K/UL — HIGH (ref 3.8–10.5)
WBC # FLD AUTO: 13.69 K/UL
WBC # FLD AUTO: 16.61 K/UL — HIGH (ref 3.8–10.5)

## 2020-07-14 PROCEDURE — 72170 X-RAY EXAM OF PELVIS: CPT | Mod: 26

## 2020-07-14 PROCEDURE — 27130 TOTAL HIP ARTHROPLASTY: CPT | Mod: 82,79,LT

## 2020-07-14 PROCEDURE — 27130 TOTAL HIP ARTHROPLASTY: CPT | Mod: 79,LT

## 2020-07-14 RX ORDER — AMLODIPINE BESYLATE 2.5 MG/1
10 TABLET ORAL DAILY
Refills: 0 | Status: DISCONTINUED | OUTPATIENT
Start: 2020-07-14 | End: 2020-07-15

## 2020-07-14 RX ORDER — SENNA PLUS 8.6 MG/1
2 TABLET ORAL AT BEDTIME
Refills: 0 | Status: DISCONTINUED | OUTPATIENT
Start: 2020-07-14 | End: 2020-07-15

## 2020-07-14 RX ORDER — POVIDONE-IODINE 5 %
1 AEROSOL (ML) TOPICAL ONCE
Refills: 0 | Status: COMPLETED | OUTPATIENT
Start: 2020-07-14 | End: 2020-07-14

## 2020-07-14 RX ORDER — TRAMADOL HYDROCHLORIDE 50 MG/1
50 TABLET ORAL EVERY 8 HOURS
Refills: 0 | Status: DISCONTINUED | OUTPATIENT
Start: 2020-07-14 | End: 2020-07-15

## 2020-07-14 RX ORDER — UMECLIDINIUM 62.5 UG/1
1 AEROSOL, POWDER ORAL
Qty: 0 | Refills: 0 | DISCHARGE

## 2020-07-14 RX ORDER — FAMOTIDINE 10 MG/ML
20 INJECTION INTRAVENOUS EVERY 12 HOURS
Refills: 0 | Status: DISCONTINUED | OUTPATIENT
Start: 2020-07-14 | End: 2020-07-14

## 2020-07-14 RX ORDER — HYDROMORPHONE HYDROCHLORIDE 2 MG/ML
0.5 INJECTION INTRAMUSCULAR; INTRAVENOUS; SUBCUTANEOUS EVERY 4 HOURS
Refills: 0 | Status: DISCONTINUED | OUTPATIENT
Start: 2020-07-14 | End: 2020-07-15

## 2020-07-14 RX ORDER — TIOTROPIUM BROMIDE 18 UG/1
1 CAPSULE ORAL; RESPIRATORY (INHALATION) DAILY
Refills: 0 | Status: DISCONTINUED | OUTPATIENT
Start: 2020-07-14 | End: 2020-07-15

## 2020-07-14 RX ORDER — LISINOPRIL 2.5 MG/1
10 TABLET ORAL DAILY
Refills: 0 | Status: DISCONTINUED | OUTPATIENT
Start: 2020-07-14 | End: 2020-07-15

## 2020-07-14 RX ORDER — KETOROLAC TROMETHAMINE 30 MG/ML
15 SYRINGE (ML) INJECTION ONCE
Refills: 0 | Status: DISCONTINUED | OUTPATIENT
Start: 2020-07-14 | End: 2020-07-14

## 2020-07-14 RX ORDER — LISINOPRIL 2.5 MG/1
1 TABLET ORAL
Qty: 0 | Refills: 0 | DISCHARGE

## 2020-07-14 RX ORDER — POLYETHYLENE GLYCOL 3350 17 G/17G
17 POWDER, FOR SOLUTION ORAL DAILY
Refills: 0 | Status: DISCONTINUED | OUTPATIENT
Start: 2020-07-14 | End: 2020-07-15

## 2020-07-14 RX ORDER — HYDROMORPHONE HYDROCHLORIDE 2 MG/ML
2 INJECTION INTRAMUSCULAR; INTRAVENOUS; SUBCUTANEOUS EVERY 4 HOURS
Refills: 0 | Status: DISCONTINUED | OUTPATIENT
Start: 2020-07-14 | End: 2020-07-14

## 2020-07-14 RX ORDER — DIPHENHYDRAMINE HCL 50 MG
25 CAPSULE ORAL AT BEDTIME
Refills: 0 | Status: DISCONTINUED | OUTPATIENT
Start: 2020-07-14 | End: 2020-07-15

## 2020-07-14 RX ORDER — SODIUM CHLORIDE 9 MG/ML
1000 INJECTION, SOLUTION INTRAVENOUS
Refills: 0 | Status: DISCONTINUED | OUTPATIENT
Start: 2020-07-14 | End: 2020-07-15

## 2020-07-14 RX ORDER — ASPIRIN/CALCIUM CARB/MAGNESIUM 324 MG
81 TABLET ORAL
Refills: 0 | Status: DISCONTINUED | OUTPATIENT
Start: 2020-07-14 | End: 2020-07-15

## 2020-07-14 RX ORDER — MAGNESIUM HYDROXIDE 400 MG/1
30 TABLET, CHEWABLE ORAL DAILY
Refills: 0 | Status: DISCONTINUED | OUTPATIENT
Start: 2020-07-14 | End: 2020-07-15

## 2020-07-14 RX ORDER — OXYCODONE HYDROCHLORIDE 5 MG/1
10 TABLET ORAL EVERY 4 HOURS
Refills: 0 | Status: DISCONTINUED | OUTPATIENT
Start: 2020-07-14 | End: 2020-07-15

## 2020-07-14 RX ORDER — AMLODIPINE BESYLATE 2.5 MG/1
1 TABLET ORAL
Qty: 0 | Refills: 0 | DISCHARGE

## 2020-07-14 RX ORDER — HYDROMORPHONE HYDROCHLORIDE 2 MG/ML
0.5 INJECTION INTRAMUSCULAR; INTRAVENOUS; SUBCUTANEOUS
Refills: 0 | Status: DISCONTINUED | OUTPATIENT
Start: 2020-07-14 | End: 2020-07-15

## 2020-07-14 RX ORDER — CEFAZOLIN SODIUM 1 G
2000 VIAL (EA) INJECTION EVERY 8 HOURS
Refills: 0 | Status: COMPLETED | OUTPATIENT
Start: 2020-07-14 | End: 2020-07-15

## 2020-07-14 RX ORDER — PANTOPRAZOLE SODIUM 20 MG/1
40 TABLET, DELAYED RELEASE ORAL
Refills: 0 | Status: DISCONTINUED | OUTPATIENT
Start: 2020-07-14 | End: 2020-07-15

## 2020-07-14 RX ORDER — ACETAMINOPHEN 500 MG
650 TABLET ORAL EVERY 6 HOURS
Refills: 0 | Status: DISCONTINUED | OUTPATIENT
Start: 2020-07-14 | End: 2020-07-15

## 2020-07-14 RX ORDER — CELECOXIB 200 MG/1
200 CAPSULE ORAL
Refills: 0 | Status: DISCONTINUED | OUTPATIENT
Start: 2020-07-14 | End: 2020-07-15

## 2020-07-14 RX ORDER — CHLORHEXIDINE GLUCONATE 213 G/1000ML
1 SOLUTION TOPICAL ONCE
Refills: 0 | Status: COMPLETED | OUTPATIENT
Start: 2020-07-14 | End: 2020-07-14

## 2020-07-14 RX ORDER — ONDANSETRON 8 MG/1
4 TABLET, FILM COATED ORAL EVERY 6 HOURS
Refills: 0 | Status: DISCONTINUED | OUTPATIENT
Start: 2020-07-14 | End: 2020-07-15

## 2020-07-14 RX ADMIN — Medication 100 MILLIGRAM(S): at 22:30

## 2020-07-14 RX ADMIN — Medication 15 MILLIGRAM(S): at 19:28

## 2020-07-14 RX ADMIN — Medication 15 MILLIGRAM(S): at 18:54

## 2020-07-14 RX ADMIN — Medication 650 MILLIGRAM(S): at 18:51

## 2020-07-14 RX ADMIN — Medication 1 APPLICATION(S): at 11:53

## 2020-07-14 RX ADMIN — Medication 81 MILLIGRAM(S): at 18:50

## 2020-07-14 RX ADMIN — TRAMADOL HYDROCHLORIDE 50 MILLIGRAM(S): 50 TABLET ORAL at 23:08

## 2020-07-14 RX ADMIN — TRAMADOL HYDROCHLORIDE 50 MILLIGRAM(S): 50 TABLET ORAL at 22:08

## 2020-07-14 RX ADMIN — OXYCODONE HYDROCHLORIDE 10 MILLIGRAM(S): 5 TABLET ORAL at 21:08

## 2020-07-14 RX ADMIN — CELECOXIB 200 MILLIGRAM(S): 200 CAPSULE ORAL at 19:28

## 2020-07-14 RX ADMIN — CELECOXIB 200 MILLIGRAM(S): 200 CAPSULE ORAL at 18:50

## 2020-07-14 RX ADMIN — CHLORHEXIDINE GLUCONATE 1 APPLICATION(S): 213 SOLUTION TOPICAL at 11:50

## 2020-07-14 RX ADMIN — Medication 650 MILLIGRAM(S): at 19:28

## 2020-07-14 RX ADMIN — OXYCODONE HYDROCHLORIDE 10 MILLIGRAM(S): 5 TABLET ORAL at 20:08

## 2020-07-14 NOTE — H&P ADULT - NSHPPHYSICALEXAM_GEN_ALL_CORE
GEN: well-developed; well-nourished; in no acute distress.   SKIN:  Right hip incision healing no erythema no swelling  HEAD: Normocephalic, atraumatic.  EYES: EOMs intact; conjunctiva and sclera clear.  ENT: No nasal discharge; airway clear.   NECK: Supple; non tender.  CARD: Regular rate and rhythm.   RESP:  Good air entry b/l, no wheezing.  ABD: Soft; non-distended; non-tender.  EXT: Left hip decreased rom 2/2 pain. sensation intact b/l LE. pulses intact b/l LE. EHL/FHL/TA/GS 5/5 b/l.  NEURO: A&O x 3, cranial nerves grossly intact.  PSYCH: Cooperative, mood and affect appropriate.

## 2020-07-14 NOTE — ADDENDUM
[FreeTextEntry1] : Labs reviewed.  No acute abnormalities.  Patient may proceed with scheduled surgery.\par

## 2020-07-14 NOTE — PHYSICAL EXAM
[No Acute Distress] : no acute distress [Well Nourished] : well nourished [Well Developed] : well developed [Well-Appearing] : well-appearing [Normal Sclera/Conjunctiva] : normal sclera/conjunctiva [PERRL] : pupils equal round and reactive to light [EOMI] : extraocular movements intact [Normal Outer Ear/Nose] : the outer ears and nose were normal in appearance [Normal Oropharynx] : the oropharynx was normal [No JVD] : no jugular venous distention [No Lymphadenopathy] : no lymphadenopathy [Supple] : supple [Thyroid Normal, No Nodules] : the thyroid was normal and there were no nodules present [No Accessory Muscle Use] : no accessory muscle use [No Respiratory Distress] : no respiratory distress  [Clear to Auscultation] : lungs were clear to auscultation bilaterally [Normal Rate] : normal rate  [Regular Rhythm] : with a regular rhythm [Normal S1, S2] : normal S1 and S2 [No Murmur] : no murmur heard [No Carotid Bruits] : no carotid bruits [No Abdominal Bruit] : a ~M bruit was not heard ~T in the abdomen [No Varicosities] : no varicosities [Pedal Pulses Present] : the pedal pulses are present [No Edema] : there was no peripheral edema [No Palpable Aorta] : no palpable aorta [No Extremity Clubbing/Cyanosis] : no extremity clubbing/cyanosis [Soft] : abdomen soft [Non Tender] : non-tender [Non-distended] : non-distended [No Masses] : no abdominal mass palpated [No HSM] : no HSM [Normal Bowel Sounds] : normal bowel sounds [Normal Posterior Cervical Nodes] : no posterior cervical lymphadenopathy [Normal Anterior Cervical Nodes] : no anterior cervical lymphadenopathy [No CVA Tenderness] : no CVA  tenderness [No Spinal Tenderness] : no spinal tenderness [No Rash] : no rash [Coordination Grossly Intact] : coordination grossly intact [No Focal Deficits] : no focal deficits [Normal Gait] : normal gait [Deep Tendon Reflexes (DTR)] : deep tendon reflexes were 2+ and symmetric [Normal Affect] : the affect was normal [Normal Insight/Judgement] : insight and judgment were intact [de-identified] : decreased hip ROM

## 2020-07-14 NOTE — RESULTS/DATA
[] : results reviewed [de-identified] : pending repeat [de-identified] : from 6/12/2020 [de-identified] : from 6/12/2020 [de-identified] : from 6/12/2020 [de-identified] : pending repeat CBC, COVID scren [de-identified] : from 6/12/2020

## 2020-07-14 NOTE — H&P ADULT - NSHPLABSRESULTS_GEN_ALL_CORE
Preop cbc, bmp, pt/inr, ptt, ua wnl  preop cxr wnl per clearance  preop ekg wnl per clearance  Nares neg  3M DOS  COVID PCR neg 7/13

## 2020-07-14 NOTE — ASSESSMENT
[Patient Optimized for Surgery] : Patient optimized for surgery [Other: _____] : [unfilled] [As per surgery] : as per surgery [FreeTextEntry4] : The patient is a 65 year old man presenting with left hip osteoarthritis . The patient is planned for right total hip arthroplasty with Dr. Scott Joshua on 7/16/2020.\par \par The patient has chronic leukocytosis, and has had hematologic workup in the past.  He does not present with acute infectious symptoms.  We will check repeat CBC to see if he is around his baseline.\par \par  I spoke directly with his Pulmonologist, Dr. Dameon Natarajan prior to his last surgery and he is aware.  He confirms mild COPD which is stable.  He had CT Chest and PFTs last year, stable.  I discussed follow-up with his Pulmonologist postoperatively.\par \par -Labs/Diagnostics reviewed with patient in detail\par -METS >4\par -RCRI = 0, 3.9% 30-day risk of death/MI/cardiac arrest\par \par Patient is at low risk for low-intermediate risk surgery/procedure\par \par Pending repeat CBC, COVID screen,Patient is MEDICALLY OPTIMIZED TO PROCEED WITH PROPOSED SURGERY.\par \par \par Pulmonology - Dr. Dameon Natarajan\par \par Recommendations:\par 1) Chronic Leukocytosis - No signs of acute infection.  Repeat CBC\par 2) Questionable LLL Lung changes-   Pulmonologist aware.  OK to proceed with surgery.  Follow-up Post-Op\par 3) Continue Incruse inhaler.  Recommend incentive spirometry perioperatively\par \par

## 2020-07-14 NOTE — PHYSICAL THERAPY INITIAL EVALUATION ADULT - PERTINENT HX OF CURRENT PROBLEM, REHAB EVAL
65M s/p Right THR a month ago c/o Left hip pain worsened over time without improvement. Failed conservative treatments. Denies numbness, tingling. Ambulates with cane. Presents today for elective Left THR.

## 2020-07-14 NOTE — PROGRESS NOTE ADULT - SUBJECTIVE AND OBJECTIVE BOX
Ortho Post Op Check    Procedure: L JO  Surgeon: Dr. Joshua    Pt comfortable without complaints, pain controlled  Denies CP, SOB, N/V, numbness/tingling     Vital Signs Last 24 Hrs  T(C): 36.6 (07-14-20 @ 19:37), Max: 37.1 (07-14-20 @ 16:52)  T(F): 97.9 (07-14-20 @ 19:37), Max: 98.8 (07-14-20 @ 19:00)  HR: 73 (07-14-20 @ 19:37) (56 - 73)  BP: 141/73 (07-14-20 @ 19:37) (141/73 - 159/77)  BP(mean): 111 (07-14-20 @ 19:00) (104 - 111)  RR: 16 (07-14-20 @ 19:37) (13 - 20)  SpO2: 96% (07-14-20 @ 19:37) (96% - 100%)      General: Pt Alert and oriented, NAD  DSG C/D/I  Pulses: 2+ DP pulse  Sensation: SILT over distal limb and symmetric to contralateral side  Motor: 5/5 EHL/FHL/TA/GS                          12.6   16.61 )-----------( 312      ( 14 Jul 2020 17:34 )             40.1           Post-op X-Ray:  hardware in place    A/P: 65yMale POD#0 s/p above procedure  - Stable  - Pain Control  - DVT ppx: Asa  - Post op abx: ancef  - PT, WBS: WBAT    Ortho Pager [phone]

## 2020-07-14 NOTE — HISTORY OF PRESENT ILLNESS
[No Pertinent Cardiac History] : no history of aortic stenosis, atrial fibrillation, coronary artery disease, recent myocardial infarction, or implantable device/pacemaker [COPD] : COPD [No Adverse Anesthesia Reaction] : no adverse anesthesia reaction in self or family member [(Patient denies any chest pain, claudication, dyspnea on exertion, orthopnea, palpitations or syncope)] : Patient denies any chest pain, claudication, dyspnea on exertion, orthopnea, palpitations or syncope [Moderate (4-6 METs)] : Moderate (4-6 METs) [Chronic Anticoagulation] : no chronic anticoagulation [FreeTextEntry1] : Left Total Hip Arthroplasty [Chronic Kidney Disease] : no chronic kidney disease [Diabetes] : no diabetes [FreeTextEntry3] : Dr. Scott Joshua [FreeTextEntry2] : 7/16/2020 [FreeTextEntry4] : The patient is a 65 year old man presenting with left hip osteoarthritis . The patient is planned for left total hip arthroplasty with Dr. Scott Joshua on 6/16/2020.\par \par He has a history of COPD on Incruse Ellipta and follows with a Pulmonologist.  He has not been on steroids.  Per his pulmonologist, Dr. Natarajan.  He states that he has mild COPD, and does not see contraindication with his prior right TKA about 1 month ago.  His postoperative course has been uncomplicated, but showed posteroperative leukocytosis.  He denies new or recent infectious symptoms.\par \par The patient has known chronic leukocytosis.  He has been worked up with a hematologist, and had a bone marrow biopsy which was apparently inconclusive.  The patient denies constitutional symptoms including fever, chills, malaise, weight loss, night sweats.  He denies productive cough, sinusitis, abdominal pain, nausea, vomiting, diarrhea, urinary complaints.\par \par METS >4\par \par The patient denies recent/active cardiopulmonary symptoms including chest pain, shortness of breath, dyspnea of exertion, palpitations, swelling, headache, dizziness, syncope.\par \par The patient denies recent COVID exposure or COVID-related symptoms.\par \par  [FreeTextEntry5] : Chronic Leukocytosis

## 2020-07-14 NOTE — PHYSICAL THERAPY INITIAL EVALUATION ADULT - ADDITIONAL COMMENTS
Pt reports living at home with family, uses a cane to ambulate at baseline, and denied any SHIRLEY his home. Pt reports using the stairs approx 3x/week to access part of his home.

## 2020-07-14 NOTE — H&P ADULT - HISTORY OF PRESENT ILLNESS
65M s/p Right THR a month ago c/o Left hip pain worsened over time without improvement. Failed conservative treatments. Denies numbness, tingling. Ambulates with cane. Presents today for elective Left THR. States right hip doing well.

## 2020-07-15 ENCOUNTER — TRANSCRIPTION ENCOUNTER (OUTPATIENT)
Age: 65
End: 2020-07-15

## 2020-07-15 VITALS
RESPIRATION RATE: 15 BRPM | SYSTOLIC BLOOD PRESSURE: 130 MMHG | OXYGEN SATURATION: 96 % | DIASTOLIC BLOOD PRESSURE: 64 MMHG | HEART RATE: 80 BPM | TEMPERATURE: 98 F

## 2020-07-15 LAB
ANION GAP SERPL CALC-SCNC: 11 MMOL/L — SIGNIFICANT CHANGE UP (ref 5–17)
BUN SERPL-MCNC: 16 MG/DL — SIGNIFICANT CHANGE UP (ref 7–23)
CALCIUM SERPL-MCNC: 8.6 MG/DL — SIGNIFICANT CHANGE UP (ref 8.4–10.5)
CHLORIDE SERPL-SCNC: 103 MMOL/L — SIGNIFICANT CHANGE UP (ref 96–108)
CO2 SERPL-SCNC: 22 MMOL/L — SIGNIFICANT CHANGE UP (ref 22–31)
CREAT SERPL-MCNC: 0.82 MG/DL — SIGNIFICANT CHANGE UP (ref 0.5–1.3)
GLUCOSE SERPL-MCNC: 124 MG/DL — HIGH (ref 70–99)
HCT VFR BLD CALC: 37.1 % — LOW (ref 39–50)
HGB BLD-MCNC: 12.1 G/DL — LOW (ref 13–17)
MCHC RBC-ENTMCNC: 30.7 PG — SIGNIFICANT CHANGE UP (ref 27–34)
MCHC RBC-ENTMCNC: 32.6 GM/DL — SIGNIFICANT CHANGE UP (ref 32–36)
MCV RBC AUTO: 94.2 FL — SIGNIFICANT CHANGE UP (ref 80–100)
NRBC # BLD: 0 /100 WBCS — SIGNIFICANT CHANGE UP (ref 0–0)
PLATELET # BLD AUTO: 293 K/UL — SIGNIFICANT CHANGE UP (ref 150–400)
POTASSIUM SERPL-MCNC: 4.7 MMOL/L — SIGNIFICANT CHANGE UP (ref 3.5–5.3)
POTASSIUM SERPL-SCNC: 4.7 MMOL/L — SIGNIFICANT CHANGE UP (ref 3.5–5.3)
RBC # BLD: 3.94 M/UL — LOW (ref 4.2–5.8)
RBC # FLD: 13.7 % — SIGNIFICANT CHANGE UP (ref 10.3–14.5)
SODIUM SERPL-SCNC: 136 MMOL/L — SIGNIFICANT CHANGE UP (ref 135–145)
WBC # BLD: 21.11 K/UL — HIGH (ref 3.8–10.5)
WBC # FLD AUTO: 21.11 K/UL — HIGH (ref 3.8–10.5)

## 2020-07-15 RX ORDER — OXYCODONE HYDROCHLORIDE 5 MG/1
10 TABLET ORAL EVERY 4 HOURS
Refills: 0 | Status: DISCONTINUED | OUTPATIENT
Start: 2020-07-15 | End: 2020-07-15

## 2020-07-15 RX ORDER — ACETAMINOPHEN 500 MG
2 TABLET ORAL
Qty: 0 | Refills: 0 | DISCHARGE
Start: 2020-07-15

## 2020-07-15 RX ORDER — KETOROLAC TROMETHAMINE 30 MG/ML
15 SYRINGE (ML) INJECTION EVERY 6 HOURS
Refills: 0 | Status: DISCONTINUED | OUTPATIENT
Start: 2020-07-15 | End: 2020-07-15

## 2020-07-15 RX ORDER — MELOXICAM 15 MG/1
1 TABLET ORAL
Qty: 30 | Refills: 0
Start: 2020-07-15 | End: 2020-08-13

## 2020-07-15 RX ORDER — MELOXICAM 15 MG/1
1 TABLET ORAL
Qty: 0 | Refills: 0 | DISCHARGE

## 2020-07-15 RX ORDER — ASPIRIN/CALCIUM CARB/MAGNESIUM 324 MG
1 TABLET ORAL
Qty: 60 | Refills: 0
Start: 2020-07-15 | End: 2020-08-13

## 2020-07-15 RX ORDER — TIOTROPIUM BROMIDE 18 UG/1
1 CAPSULE ORAL; RESPIRATORY (INHALATION)
Qty: 0 | Refills: 0 | DISCHARGE
Start: 2020-07-15

## 2020-07-15 RX ORDER — OXYCODONE HYDROCHLORIDE 5 MG/1
1 TABLET ORAL
Qty: 42 | Refills: 0
Start: 2020-07-15 | End: 2020-07-21

## 2020-07-15 RX ORDER — OXYCODONE HYDROCHLORIDE 5 MG/1
5 TABLET ORAL EVERY 4 HOURS
Refills: 0 | Status: DISCONTINUED | OUTPATIENT
Start: 2020-07-15 | End: 2020-07-15

## 2020-07-15 RX ADMIN — OXYCODONE HYDROCHLORIDE 10 MILLIGRAM(S): 5 TABLET ORAL at 09:36

## 2020-07-15 RX ADMIN — OXYCODONE HYDROCHLORIDE 10 MILLIGRAM(S): 5 TABLET ORAL at 04:55

## 2020-07-15 RX ADMIN — OXYCODONE HYDROCHLORIDE 10 MILLIGRAM(S): 5 TABLET ORAL at 03:55

## 2020-07-15 RX ADMIN — TRAMADOL HYDROCHLORIDE 50 MILLIGRAM(S): 50 TABLET ORAL at 05:10

## 2020-07-15 RX ADMIN — AMLODIPINE BESYLATE 10 MILLIGRAM(S): 2.5 TABLET ORAL at 05:11

## 2020-07-15 RX ADMIN — Medication 81 MILLIGRAM(S): at 05:11

## 2020-07-15 RX ADMIN — TRAMADOL HYDROCHLORIDE 50 MILLIGRAM(S): 50 TABLET ORAL at 06:00

## 2020-07-15 RX ADMIN — LISINOPRIL 10 MILLIGRAM(S): 2.5 TABLET ORAL at 05:10

## 2020-07-15 RX ADMIN — Medication 650 MILLIGRAM(S): at 05:10

## 2020-07-15 RX ADMIN — OXYCODONE HYDROCHLORIDE 10 MILLIGRAM(S): 5 TABLET ORAL at 08:49

## 2020-07-15 RX ADMIN — Medication 650 MILLIGRAM(S): at 06:00

## 2020-07-15 RX ADMIN — PANTOPRAZOLE SODIUM 40 MILLIGRAM(S): 20 TABLET, DELAYED RELEASE ORAL at 05:11

## 2020-07-15 RX ADMIN — Medication 100 MILLIGRAM(S): at 06:10

## 2020-07-15 NOTE — DISCHARGE NOTE PROVIDER - HOSPITAL COURSE
Admitted 7/14/20    Surgery- left total hip replacement 7/14/20    Juanis-op Antibiotics    Pain control    DVT prophylaxis    OOB/Physical Therapy

## 2020-07-15 NOTE — DISCHARGE NOTE PROVIDER - CARE PROVIDER_API CALL
Scott Joshua)  Orthopaedic Surgery  [address]  [place], [place] [address]  Phone: [phone]  Fax: [phone]  Follow Up Time: 2 weeks

## 2020-07-15 NOTE — DISCHARGE NOTE PROVIDER - NSDCACTIVITY_GEN_ALL_CORE
Walking - Indoors allowed/Showering allowed/No heavy lifting/straining/Do not drive or operate machinery

## 2020-07-15 NOTE — DISCHARGE NOTE PROVIDER - NSDCFUADDINST_GEN_ALL_CORE_FT
Protected weight on operated hip using a rolling walker  Anterior hip precautions to prevent hip dislocation    No strenuous activity, heavy lifting, driving or returning to work until cleared by MD.  You may shower - dressing is water-resistant, no soaking in bathtubs.  Keep dressing on your hip until the follow up appointment.  Try to have regular bowel movements, take stool softener or laxative if necessary.  May take Pepcid or Zantac for upset stomach.  Swelling may travel all the way down leg to foot, this is normal and will subside in a few weeks.  Call to schedule an appt with Dr. Joshua for follow up.    Contact your doctor if you experience: fever greater than 101.5, chills, chest pain, difficulty breathing, redness or excessive drainage around the incision, other concerns.  Follow up with your primary care provider.

## 2020-07-15 NOTE — DISCHARGE NOTE PROVIDER - NSDCCPCAREPLAN_GEN_ALL_CORE_FT
PRINCIPAL DISCHARGE DIAGNOSIS  Diagnosis: Primary osteoarthritis of left hip  Assessment and Plan of Treatment: imrovement s/p Left THR

## 2020-07-15 NOTE — DISCHARGE NOTE NURSING/CASE MANAGEMENT/SOCIAL WORK - PATIENT PORTAL LINK FT
You can access the FollowMyHealth Patient Portal offered by [facility] by registering at the following website: [email or web address]. By joining [facility]’s FollowMyHealth portal, you will also be able to view your health information using other applications (apps) compatible with our system.

## 2020-07-15 NOTE — DISCHARGE NOTE PROVIDER - NSDCHHNEEDSERVICE_GEN_ALL_CORE
Rehabilitation services/Medication teaching and assessment/Observation and assessment/Wound care and assessment

## 2020-07-15 NOTE — DISCHARGE NOTE PROVIDER - NSDCMRMEDTOKEN_GEN_ALL_CORE_FT
acetaminophen 325 mg oral tablet: 2 tab(s) orally every 6 hours  amLODIPine 10 mg oral tablet: 1 tab(s) orally once a day  aspirin 81 mg oral delayed release tablet: 1 tab(s) orally 2 times a day for 1 month  Incruse Ellipta 62.5 mcg/inh inhalation powder: 1 dose(s) inhaled once a day  lisinopril 10 mg oral tablet: 1 tab(s) orally once a day  meloxicam 15 mg oral tablet: 1 tab(s) orally once a day  oxyCODONE 5 mg oral tablet: 1-2 tab(s) orally every 4-6 hours, as needed for moderate to severe pain MDD:6  tiotropium 18 mcg inhalation capsule: 1 cap(s) inhaled once a day

## 2020-07-17 PROCEDURE — 97116 GAIT TRAINING THERAPY: CPT

## 2020-07-17 PROCEDURE — 72170 X-RAY EXAM OF PELVIS: CPT

## 2020-07-17 PROCEDURE — C1776: CPT

## 2020-07-17 PROCEDURE — 97161 PT EVAL LOW COMPLEX 20 MIN: CPT

## 2020-07-17 PROCEDURE — 80048 BASIC METABOLIC PNL TOTAL CA: CPT

## 2020-07-17 PROCEDURE — 36415 COLL VENOUS BLD VENIPUNCTURE: CPT

## 2020-07-17 PROCEDURE — 85027 COMPLETE CBC AUTOMATED: CPT

## 2020-07-22 DIAGNOSIS — I10 ESSENTIAL (PRIMARY) HYPERTENSION: ICD-10-CM

## 2020-07-22 DIAGNOSIS — Q60.0 RENAL AGENESIS, UNILATERAL: ICD-10-CM

## 2020-07-22 DIAGNOSIS — M16.12 UNILATERAL PRIMARY OSTEOARTHRITIS, LEFT HIP: ICD-10-CM

## 2020-07-22 DIAGNOSIS — B18.2 CHRONIC VIRAL HEPATITIS C: ICD-10-CM

## 2020-07-22 DIAGNOSIS — J44.9 CHRONIC OBSTRUCTIVE PULMONARY DISEASE, UNSPECIFIED: ICD-10-CM

## 2020-07-22 DIAGNOSIS — D72.829 ELEVATED WHITE BLOOD CELL COUNT, UNSPECIFIED: ICD-10-CM

## 2020-07-24 ENCOUNTER — APPOINTMENT (OUTPATIENT)
Dept: ORTHOPEDIC SURGERY | Facility: CLINIC | Age: 65
End: 2020-07-24
Payer: COMMERCIAL

## 2020-07-24 DIAGNOSIS — Z47.1 AFTERCARE FOLLOWING JOINT REPLACEMENT SURGERY: ICD-10-CM

## 2020-07-24 DIAGNOSIS — Z96.642 AFTERCARE FOLLOWING JOINT REPLACEMENT SURGERY: ICD-10-CM

## 2020-07-24 PROCEDURE — 99024 POSTOP FOLLOW-UP VISIT: CPT

## 2020-08-07 NOTE — HISTORY OF PRESENT ILLNESS
[de-identified] : 64 yo male s/p Left JO on 07/14/20 and feeling great. Ambulation, mobility, and ROM are improved. No other complaints at this time. \par

## 2020-08-07 NOTE — ASSESSMENT
[FreeTextEntry1] : Assessment/Plan:\par \par \par 66 yo male here left JO, doing well\par Plan:\par \par 1) FU at 8 month anniversary\par 2) Prescribed Pain Meds \par 3) Prescribed PT at 5 week anniversary of the surgery\par

## 2020-08-07 NOTE — PHYSICAL EXAM
[de-identified] : Not in acute distress, dressed appropriately, sitting on examination table \par Skin: Warm and dry, normal turgor, no rashes \par Neurological: AOx3, Cranial nerves grossly in tact \par Psych: Mood and affect appropriate \par Left Hip: Well healed surgical incision. No swelling edema erythema redness or drainage. Nontender.. ROM: Not assessed. 5/5 strength. Normal gait.\par \par  [de-identified] : none today

## 2020-08-19 RX ORDER — MELOXICAM 15 MG/1
15 TABLET ORAL DAILY
Qty: 1 | Refills: 0 | Status: ACTIVE | COMMUNITY
Start: 2019-12-16 | End: 1900-01-01

## 2020-08-19 RX ORDER — OXYCODONE 5 MG/1
5 TABLET ORAL
Qty: 40 | Refills: 0 | Status: ACTIVE | COMMUNITY
Start: 2020-07-24 | End: 1900-01-01

## 2020-09-02 ENCOUNTER — APPOINTMENT (OUTPATIENT)
Dept: ORTHOPEDIC SURGERY | Facility: CLINIC | Age: 65
End: 2020-09-02

## 2021-01-21 NOTE — PHYSICAL THERAPY INITIAL EVALUATION ADULT - PHYSICAL ASSIST/NONPHYSICAL ASSIST: STAND/SIT, REHAB EVAL
DVT: patient on heparin drip DVT: patient on heparin drip DVT: patient on heparin drip 1 person assist/verbal cues

## 2021-01-25 ENCOUNTER — RESULT REVIEW (OUTPATIENT)
Age: 66
End: 2021-01-25

## 2021-01-25 ENCOUNTER — APPOINTMENT (OUTPATIENT)
Dept: ORTHOPEDIC SURGERY | Facility: CLINIC | Age: 66
End: 2021-01-25
Payer: SUBSIDIZED

## 2021-01-25 ENCOUNTER — OUTPATIENT (OUTPATIENT)
Dept: OUTPATIENT SERVICES | Facility: HOSPITAL | Age: 66
LOS: 1 days | End: 2021-01-25
Payer: SUBSIDIZED

## 2021-01-25 DIAGNOSIS — Z96.641 PRESENCE OF RIGHT ARTIFICIAL HIP JOINT: Chronic | ICD-10-CM

## 2021-01-25 DIAGNOSIS — Z98.890 OTHER SPECIFIED POSTPROCEDURAL STATES: Chronic | ICD-10-CM

## 2021-01-25 PROCEDURE — 73501 X-RAY EXAM HIP UNI 1 VIEW: CPT

## 2021-01-25 PROCEDURE — ZZZZZ: CPT

## 2021-01-25 PROCEDURE — 73501 X-RAY EXAM HIP UNI 1 VIEW: CPT | Mod: 26,LT

## 2022-09-01 NOTE — PHYSICAL THERAPY INITIAL EVALUATION ADULT - STANDING BALANCE: DYNAMIC, REHAB EVAL
fair minus Cimetidine Counseling:  I discussed with the patient the risks of Cimetidine including but not limited to gynecomastia, headache, diarrhea, nausea, drowsiness, arrhythmias, pancreatitis, skin rashes, psychosis, bone marrow suppression and kidney toxicity.

## 2024-01-24 NOTE — DISCHARGE NOTE PROVIDER - NSDCCAREPROVSEEN_GEN_ALL_CORE_FT
Pt needs to sign Authorization form for his colonoscopy.  Authorization form placed at the .  Pt's daughter Monae called. She did not answer. I left a voice message.    
Scott Joshua